# Patient Record
Sex: MALE | URBAN - METROPOLITAN AREA
[De-identification: names, ages, dates, MRNs, and addresses within clinical notes are randomized per-mention and may not be internally consistent; named-entity substitution may affect disease eponyms.]

---

## 2017-01-02 ENCOUNTER — TRANSFERRED RECORDS (OUTPATIENT)
Dept: HEALTH INFORMATION MANAGEMENT | Facility: CLINIC | Age: 47
End: 2017-01-02

## 2017-01-14 ENCOUNTER — TRANSFERRED RECORDS (OUTPATIENT)
Dept: HEALTH INFORMATION MANAGEMENT | Facility: CLINIC | Age: 47
End: 2017-01-14

## 2017-01-18 ENCOUNTER — MEDICAL CORRESPONDENCE (OUTPATIENT)
Dept: HEALTH INFORMATION MANAGEMENT | Facility: CLINIC | Age: 47
End: 2017-01-18

## 2017-01-20 ENCOUNTER — PRE VISIT (OUTPATIENT)
Dept: ORTHOPEDICS | Facility: CLINIC | Age: 47
End: 2017-01-20

## 2017-01-20 NOTE — TELEPHONE ENCOUNTER
1.  Date/reason for appt: 1/24/17 3:00PM Lump on leg  2.  Referring provider: MITCH WOLF MD   3.  Call to patient (Yes / No - short description): Yes, are there outside recs?  4.  Previous care at / records requested from:  [facility] (Need VALERIE)   [facility] - (Need VALERIE)

## 2017-01-23 NOTE — TELEPHONE ENCOUNTER
[facility] states they have no record of this pt on file. Called pt to clarify where he was seen, pt saw Dr. Patel Ruiz at [facility].   Spoke with Patt from [facility] who is going to fax pt's recs and push images to the [facility] PACS System. Once pushed I will notify the film room to pull images.

## 2017-01-24 ENCOUNTER — OFFICE VISIT (OUTPATIENT)
Dept: ORTHOPEDICS | Facility: CLINIC | Age: 47
End: 2017-01-24

## 2017-01-24 VITALS — WEIGHT: 315 LBS | HEIGHT: 75 IN | BODY MASS INDEX: 39.17 KG/M2

## 2017-01-24 DIAGNOSIS — D16.22 BENIGN NEOPLASM OF LONG BONE OF LEFT LOWER EXTREMITY: Primary | ICD-10-CM

## 2017-01-24 PROBLEM — G47.30 SLEEP APNEA: Status: ACTIVE | Noted: 2017-01-24

## 2017-01-24 PROBLEM — M48.50XA VERTEBRAL COMPRESSION FRACTURE (H): Status: ACTIVE | Noted: 2017-01-24

## 2017-01-24 PROBLEM — I10 HYPERTENSION: Status: ACTIVE | Noted: 2017-01-24

## 2017-01-24 PROBLEM — K21.00 GASTROESOPHAGEAL REFLUX DISEASE WITH ESOPHAGITIS: Status: ACTIVE | Noted: 2017-01-24

## 2017-01-24 RX ORDER — NAPROXEN SODIUM 220 MG
TABLET ORAL
COMMUNITY

## 2017-01-24 RX ORDER — ASPIRIN 81 MG/1
TABLET ORAL
COMMUNITY

## 2017-01-24 RX ORDER — ATENOLOL 25 MG/1
TABLET ORAL
COMMUNITY
Start: 2016-05-05

## 2017-01-24 RX ORDER — ESOMEPRAZOLE MAGNESIUM 40 MG/1
CAPSULE, DELAYED RELEASE ORAL
COMMUNITY
Start: 2016-09-06

## 2017-01-24 ASSESSMENT — ENCOUNTER SYMPTOMS
FEVER: 0
ARTHRALGIAS: 1
POLYPHAGIA: 0
TINGLING: 1
BOWEL INCONTINENCE: 0
LOSS OF CONSCIOUSNESS: 0
RECTAL PAIN: 0
WHEEZING: 0
EYE REDNESS: 0
BACK PAIN: 1
MYALGIAS: 1
CONSTIPATION: 0
EYE PAIN: 0
SINUS CONGESTION: 1
COUGH: 1
JAUNDICE: 0
MEMORY LOSS: 0
DYSPNEA ON EXERTION: 1
WEIGHT LOSS: 0
NAUSEA: 0
JOINT SWELLING: 1
SPEECH CHANGE: 0
SORE THROAT: 0
NECK MASS: 0
ABDOMINAL PAIN: 0
STIFFNESS: 1
EYE IRRITATION: 0
HEMOPTYSIS: 0
CHILLS: 0
TASTE DISTURBANCE: 0
SEIZURES: 0
DIARRHEA: 1
VOMITING: 0
SHORTNESS OF BREATH: 0
PARALYSIS: 0
BLOATING: 0
EYE WATERING: 0
HEARTBURN: 0
SINUS PAIN: 1
INCREASED ENERGY: 0
WEAKNESS: 0
HEADACHES: 1
ALTERED TEMPERATURE REGULATION: 0
SNORES LOUDLY: 0
HALLUCINATIONS: 0
DIZZINESS: 0
RECTAL BLEEDING: 0
NUMBNESS: 1
TROUBLE SWALLOWING: 0
WEIGHT GAIN: 0
NECK PAIN: 0
DISTURBANCES IN COORDINATION: 0
POSTURAL DYSPNEA: 0
NIGHT SWEATS: 0
RESPIRATORY PAIN: 0
SPUTUM PRODUCTION: 1
FATIGUE: 0
MUSCLE WEAKNESS: 1
SMELL DISTURBANCE: 0
BLOOD IN STOOL: 0
DOUBLE VISION: 0
COUGH DISTURBING SLEEP: 0
HOARSE VOICE: 0
POLYDIPSIA: 0
TREMORS: 0
DECREASED APPETITE: 0
MUSCLE CRAMPS: 0

## 2017-01-24 NOTE — Clinical Note
1/24/2017       RE: Cyril Tejada  [address] [place] [id number]     Dear Colleague,    Thank you for referring your patient, Cyril Tejada, to the [facility] at [facility]. Please see a copy of my visit note below.    REFERRING PHYSICIAN:  Patel Ruiz NP.      CHIEF COMPLAINT:  Incidentally discovered left distal femur lesion.      HISTORY OF PRESENT ILLNESS:  Mr. Tejada is a pleasant 46-year-old male with a history of morbid obesity, obstructive sleep apnea, hypertension, GERD and chronic elevations in white count and CRP who presents accompanied by his wife today for evaluation by Dr. Pimentel of an incidentally discovered left distal femur lesion.      The patient states that on 01/02/2017, he was shoveling snow on his driveway when he slipped on a patch of ice and fell onto his left knee.  He had immediate pain and deformity consistent with a lateral patellar dislocation.  The patella spontaneously reduced.  He sought care in the Emergency Department that evening at [facility].      IMAGING:  Radiographs of the left knee at that time were unremarkable for fracture or dislocation.  However, on the AP image at the very most proximal aspect of the image, he was noticed have a cortically based lesion of the medial femoral cortex.  His wife works at [employer], and she was encouraged to have him work this up further.  He then received plain radiographs of the left femur on 1/11/2017 followed by an MRI of the left distal femur on 01/14/2017.  The patient was then referred for further evaluation and management of this lesion.      The patient denies any prior surgery or injuries about this area.  He states that he did have some bilateral leg pain recently; however, this completely resolved with an epidural steroid injection that he received approximately 1 month ago.  He denies any known lumps or bumps or other masses.  He denies any fevers,  chills, night sweats, unintentional weight loss, new cough, chest pain, shortness of breath, belly pain, nausea, vomiting, hematuria, blood in his stool or lymphadenopathy.      With regards to his patellar dislocation, the patient states that his knee pain continues to improve.  He is now ambulating without a cane.  He feels that his strength is returning.      PAST MEDICAL HISTORY:  Hypertension, GERD, obstructive sleep apnea, chronic low back pain with recent epidural steroid injection, and chronic elevation of his white count and CRP that is followed by a hematologist.      PAST SURGICAL HISTORY:  Hernia repair, tonsillectomy, right ankle ligament repair.  Left ulnar nerve transposition, TMJ surgery.      MEDICATIONS:  Atenolol, Nexium, and ibuprofen and Tylenol, aspirin 81 mg daily.      ALLERGIES:  No known drug allergies.      FAMILY MEDICAL HISTORY:  No family history of bone or soft tissue tumors.  No bleeding, clotting or anesthesia-related complications.  His father has skin cancer.      SOCIAL HISTORY:  The patient lives in [place], [place] in a house with his wife and 2 children, ages 6 and 8.  He works full-time as a .  His work is sedentary by nature.  He spends a lot of time with his children.  No specific athletics or outdoor activities for the patient.  He quit smoking in 2007.  He has 1-2 alcoholic drinks per month.  He denies any illicit drug use.      PHYSICAL EXAMINATION:  On exam, the patient is a pleasant adult male in no apparent distress.  His height is 6 feet 2.5 inches, weight 361 pounds, calculated BMI of 45.79.  His respirations are nonlabored on room air.  His affect and mood are congruent.      Focused examination of the left lower extremity demonstrates a left knee that is without effusion.  There are no palpable masses about the left distal thigh or proximal tibia.  The left knee moves freely.  He is able to perform a straight leg raise.  He is able to  ambulate with a nonantalgic gait pattern today.  He does have his cane with him today, but states that he does not need it.      IMAGING:  Plain radiographs of the left knee were closely reviewed and demonstrate at the far proximal aspect of the image a small radiolucency in the medial femoral cortex with some surrounding sclerotic bone formation.  Repeat radiographs on 1/11/2017 demonstrate stable appearance of this lesion that measures approximately 11.8 x 8 x 4 mm in maximum diameter.  There are well-defined sclerotic borders.  There is no periosteal reaction.  There are no other abnormalities about the bone.  MRI of the left thigh taken on 1/14/2017 was also reviewed.  This demonstrates a cortically based mass involving the medial aspect of the mid distal left femur.  It appears benign in nature.      IMPRESSION:  Benign cortically based lesion of the left distal femur.      We had a nice discussion with Cyril mckenzie.  At this time, we would not recommend proceeding with a biopsy as all of the imaging is consistent with a benign lesion.  In this instance, a biopsy would pose more risk to the patient than doing nothing.  At most, we would recommend considering a repeat radiograph of the left femur in 6 months with continued monitoring for his symptoms.  The patient may have this done by his primary care provider in [place].  All questions and concerns were answered.  If a repeat radiograph was done in 6 months and there was no significant change to the lesion and no new development of pain, the patient could discontinue all further monitoring at that point.      This patient is not required return to see Dr. Pimentel but is welcome back at any time if have any concerns or questions develop in the future.  We encouraged him to call.      This patient was seen with Dr. Pimentel, who agrees with the above.      cc: Patel Ruiz, NP       Again, thank you for allowing me to participate in the care of your patient.       Sincerely,    Chris Pimentel MD

## 2017-01-24 NOTE — NURSING NOTE
"Reason For Visit:   Chief Complaint   Patient presents with     Consult     Pt's states that he fell on the Ice on Jan. 2nd. He was seen in the ED ([facility]) where he had XR and a Lesion of the Left Distal Femur was detected. He then had an MRI. He states that he has been having blood work done with a Hematologist due to abnormal/Elevated Labs.Referring:  MITCH WOLF             Ht 1.892 m (6' 2.5\")  Wt 163.93 kg (361 lb 6.4 oz)  BMI 45.79 kg/m2                 Current Outpatient Prescriptions   Medication     NEW MED     naproxen sodium (ANAPROX) 220 MG tablet     esomeprazole (NEXIUM) 40 MG CR capsule     atenolol (TENORMIN) 25 MG tablet     order for DME     aspirin EC 81 MG EC tablet     No current facility-administered medications for this visit.       No Known Allergies    Maya De La Torre C.M.A.               "

## 2017-01-24 NOTE — PROGRESS NOTES
REFERRING PHYSICIAN:  Patel Ruiz NP.      CHIEF COMPLAINT:  Incidentally discovered left distal femur lesion.      HISTORY OF PRESENT ILLNESS:  Mr. Tejada is a pleasant 46-year-old male with a history of morbid obesity, obstructive sleep apnea, hypertension, GERD and chronic elevations in white count and CRP who presents accompanied by his wife today for evaluation by Dr. Pimentel of an incidentally discovered left distal femur lesion.      The patient states that on 01/02/2017, he was shoveling snow on his driveway when he slipped on a patch of ice and fell onto his left knee.  He had immediate pain and deformity consistent with a lateral patellar dislocation.  The patella spontaneously reduced.  He sought care in the Emergency Department that evening at [facility].      IMAGING:  Radiographs of the left knee at that time were unremarkable for fracture or dislocation.  However, on the AP image at the very most proximal aspect of the image, he was noticed have a cortically based lesion of the medial femoral cortex.  His wife works at [employer], and she was encouraged to have him work this up further.  He then received plain radiographs of the left femur on 1/11/2017 followed by an MRI of the left distal femur on 01/14/2017.  The patient was then referred for further evaluation and management of this lesion.      The patient denies any prior surgery or injuries about this area.  He states that he did have some bilateral leg pain recently; however, this completely resolved with an epidural steroid injection that he received approximately 1 month ago.  He denies any known lumps or bumps or other masses.  He denies any fevers, chills, night sweats, unintentional weight loss, new cough, chest pain, shortness of breath, belly pain, nausea, vomiting, hematuria, blood in his stool or lymphadenopathy.      With regards to his patellar dislocation, the patient states that his knee pain continues to improve.  He is  now ambulating without a cane.  He feels that his strength is returning.      PAST MEDICAL HISTORY:  Hypertension, GERD, obstructive sleep apnea, chronic low back pain with recent epidural steroid injection, and chronic elevation of his white count and CRP that is followed by a hematologist.      PAST SURGICAL HISTORY:  Hernia repair, tonsillectomy, right ankle ligament repair.  Left ulnar nerve transposition, TMJ surgery.      MEDICATIONS:  Atenolol, Nexium, and ibuprofen and Tylenol, aspirin 81 mg daily.      ALLERGIES:  No known drug allergies.      FAMILY MEDICAL HISTORY:  No family history of bone or soft tissue tumors.  No bleeding, clotting or anesthesia-related complications.  His father has skin cancer.      SOCIAL HISTORY:  The patient lives in [place], [place] in a house with his wife and 2 children, ages 6 and 8.  He works full-time as a .  His work is sedentary by nature.  He spends a lot of time with his children.  No specific athletics or outdoor activities for the patient.  He quit smoking in 2007.  He has 1-2 alcoholic drinks per month.  He denies any illicit drug use.      PHYSICAL EXAMINATION:  On exam, the patient is a pleasant adult male in no apparent distress.  His height is 6 feet 2.5 inches, weight 361 pounds, calculated BMI of 45.79.  His respirations are nonlabored on room air.  His affect and mood are congruent.      Focused examination of the left lower extremity demonstrates a left knee that is without effusion.  There are no palpable masses about the left distal thigh or proximal tibia.  The left knee moves freely.  He is able to perform a straight leg raise.  He is able to ambulate with a nonantalgic gait pattern today.  He does have his cane with him today, but states that he does not need it.      IMAGING:  Plain radiographs of the left knee were closely reviewed and demonstrate at the far proximal aspect of the image a small radiolucency in the medial femoral  cortex with some surrounding sclerotic bone formation.  Repeat radiographs on 1/11/2017 demonstrate stable appearance of this lesion that measures approximately 11.8 x 8 x 4 mm in maximum diameter.  There are well-defined sclerotic borders.  There is no periosteal reaction.  There are no other abnormalities about the bone.  MRI of the left thigh taken on 1/14/2017 was also reviewed.  This demonstrates a cortically based mass involving the medial aspect of the mid distal left femur.  It appears benign in nature.      IMPRESSION:  Benign cortically based lesion of the left distal femur.      We had a nice discussion with Cyril mckenzie.  At this time, we would not recommend proceeding with a biopsy as all of the imaging is consistent with a benign lesion.  In this instance, a biopsy would pose more risk to the patient than doing nothing.  At most, we would recommend considering a repeat radiograph of the left femur in 6 months with continued monitoring for his symptoms.  The patient may have this done by his primary care provider in [place].  All questions and concerns were answered.  If a repeat radiograph was done in 6 months and there was no significant change to the lesion and no new development of pain, the patient could discontinue all further monitoring at that point.      This patient is not required return to see Dr. Pimentel but is welcome back at any time if have any concerns or questions develop in the future.  We encouraged him to call.      This patient was seen with Dr. Pimentel, who agrees with the above.      cc: Patel Ruiz, EVI

## 2017-01-24 NOTE — TELEPHONE ENCOUNTER
[facility] radiology reports forwarded to clinic. Images are in pacs.    MRI Left thigh/femur 1/14/17   MRI left Knee 1/14/17    Left Femur Xray 1/11/17

## 2017-01-24 NOTE — TELEPHONE ENCOUNTER
[facility] radiology report received, forwarded to clinic. Image is in pacs.   - Left Knee Xray 1/2/17